# Patient Record
Sex: MALE | Race: BLACK OR AFRICAN AMERICAN | Employment: FULL TIME | URBAN - METROPOLITAN AREA
[De-identification: names, ages, dates, MRNs, and addresses within clinical notes are randomized per-mention and may not be internally consistent; named-entity substitution may affect disease eponyms.]

---

## 2019-02-20 ENCOUNTER — HOSPITAL ENCOUNTER (EMERGENCY)
Age: 39
Discharge: HOME | End: 2019-02-20
Payer: COMMERCIAL

## 2019-02-20 VITALS
RESPIRATION RATE: 16 BRPM | SYSTOLIC BLOOD PRESSURE: 134 MMHG | BODY MASS INDEX: 29.12 KG/M2 | HEIGHT: 72 IN | OXYGEN SATURATION: 97 % | HEART RATE: 84 BPM | WEIGHT: 215 LBS | TEMPERATURE: 98.5 F | DIASTOLIC BLOOD PRESSURE: 84 MMHG

## 2019-02-20 DIAGNOSIS — J02.9 ACUTE PHARYNGITIS, UNSPECIFIED ETIOLOGY: Primary | ICD-10-CM

## 2019-02-20 LAB — STREP GRP A PCR: NEGATIVE

## 2019-02-20 PROCEDURE — 99283 EMERGENCY DEPT VISIT LOW MDM: CPT

## 2019-02-20 PROCEDURE — 6370000000 HC RX 637 (ALT 250 FOR IP): Performed by: PHYSICIAN ASSISTANT

## 2019-02-20 PROCEDURE — 87880 STREP A ASSAY W/OPTIC: CPT

## 2019-02-20 RX ORDER — PREDNISONE 20 MG/1
60 TABLET ORAL ONCE
Status: COMPLETED | OUTPATIENT
Start: 2019-02-20 | End: 2019-02-20

## 2019-02-20 RX ORDER — PREDNISONE 10 MG/1
40 TABLET ORAL DAILY
Qty: 20 TABLET | Refills: 0 | Status: SHIPPED | OUTPATIENT
Start: 2019-02-20 | End: 2019-02-25

## 2019-02-20 RX ORDER — HYDROCHLOROTHIAZIDE 25 MG/1
25 TABLET ORAL DAILY
COMMUNITY

## 2019-02-20 RX ORDER — AMLODIPINE BESYLATE 10 MG/1
10 TABLET ORAL DAILY
COMMUNITY

## 2019-02-20 RX ADMIN — PREDNISONE 60 MG: 20 TABLET ORAL at 14:41

## 2019-03-30 ENCOUNTER — HOSPITAL ENCOUNTER (OUTPATIENT)
Age: 39
Setting detail: OBSERVATION
Discharge: HOME | End: 2019-03-31
Attending: EMERGENCY MEDICINE | Admitting: INTERNAL MEDICINE
Payer: COMMERCIAL

## 2019-03-30 ENCOUNTER — APPOINTMENT (OUTPATIENT)
Dept: GENERAL RADIOLOGY | Age: 39
End: 2019-03-30
Payer: COMMERCIAL

## 2019-03-30 DIAGNOSIS — F14.10 COCAINE ABUSE (HCC): ICD-10-CM

## 2019-03-30 DIAGNOSIS — R07.9 CHEST PAIN, UNSPECIFIED TYPE: Primary | ICD-10-CM

## 2019-03-30 LAB
ACETAMINOPHEN LEVEL: <5 MCG/ML (ref 10–30)
AMPHETAMINE SCREEN, URINE: NOT DETECTED
ANION GAP SERPL CALCULATED.3IONS-SCNC: 14 MMOL/L (ref 7–16)
BARBITURATE SCREEN URINE: NOT DETECTED
BASOPHILS ABSOLUTE: 0.03 E9/L (ref 0–0.2)
BASOPHILS RELATIVE PERCENT: 0.3 % (ref 0–2)
BENZODIAZEPINE SCREEN, URINE: NOT DETECTED
BUN BLDV-MCNC: 10 MG/DL (ref 6–20)
CALCIUM SERPL-MCNC: 9.4 MG/DL (ref 8.6–10.2)
CANNABINOID SCREEN URINE: POSITIVE
CHLORIDE BLD-SCNC: 101 MMOL/L (ref 98–107)
CO2: 27 MMOL/L (ref 22–29)
COCAINE METABOLITE SCREEN URINE: POSITIVE
CREAT SERPL-MCNC: 1 MG/DL (ref 0.7–1.2)
EOSINOPHILS ABSOLUTE: 0 E9/L (ref 0.05–0.5)
EOSINOPHILS RELATIVE PERCENT: 0 % (ref 0–6)
ETHANOL: <10 MG/DL (ref 0–0.08)
GFR AFRICAN AMERICAN: >60
GFR NON-AFRICAN AMERICAN: >60 ML/MIN/1.73
GLUCOSE BLD-MCNC: 94 MG/DL (ref 74–99)
HCT VFR BLD CALC: 39 % (ref 37–54)
HEMOGLOBIN: 13.2 G/DL (ref 12.5–16.5)
IMMATURE GRANULOCYTES #: 0.04 E9/L
IMMATURE GRANULOCYTES %: 0.4 % (ref 0–5)
LACTIC ACID: 0.8 MMOL/L (ref 0.5–2.2)
LYMPHOCYTES ABSOLUTE: 2.17 E9/L (ref 1.5–4)
LYMPHOCYTES RELATIVE PERCENT: 20.8 % (ref 20–42)
MCH RBC QN AUTO: 28 PG (ref 26–35)
MCHC RBC AUTO-ENTMCNC: 33.8 % (ref 32–34.5)
MCV RBC AUTO: 82.8 FL (ref 80–99.9)
METHADONE SCREEN, URINE: NOT DETECTED
MONOCYTES ABSOLUTE: 1.01 E9/L (ref 0.1–0.95)
MONOCYTES RELATIVE PERCENT: 9.7 % (ref 2–12)
NEUTROPHILS ABSOLUTE: 7.2 E9/L (ref 1.8–7.3)
NEUTROPHILS RELATIVE PERCENT: 68.8 % (ref 43–80)
OPIATE SCREEN URINE: NOT DETECTED
PDW BLD-RTO: 13.7 FL (ref 11.5–15)
PHENCYCLIDINE SCREEN URINE: NOT DETECTED
PLATELET # BLD: 276 E9/L (ref 130–450)
PMV BLD AUTO: 9.5 FL (ref 7–12)
POTASSIUM REFLEX MAGNESIUM: 3.9 MMOL/L (ref 3.5–5)
PROPOXYPHENE SCREEN: NOT DETECTED
RBC # BLD: 4.71 E12/L (ref 3.8–5.8)
SALICYLATE, SERUM: <0.3 MG/DL (ref 0–30)
SODIUM BLD-SCNC: 142 MMOL/L (ref 132–146)
TOTAL CK: 794 U/L (ref 20–200)
TOTAL CK: 821 U/L (ref 20–200)
TOTAL CK: 868 U/L (ref 20–200)
TRICYCLIC ANTIDEPRESSANTS SCREEN SERUM: NEGATIVE NG/ML
TROPONIN: <0.01 NG/ML (ref 0–0.03)
WBC # BLD: 10.5 E9/L (ref 4.5–11.5)

## 2019-03-30 PROCEDURE — 2580000003 HC RX 258: Performed by: INTERNAL MEDICINE

## 2019-03-30 PROCEDURE — 99285 EMERGENCY DEPT VISIT HI MDM: CPT

## 2019-03-30 PROCEDURE — 99219 PR INITIAL OBSERVATION CARE/DAY 50 MINUTES: CPT | Performed by: INTERNAL MEDICINE

## 2019-03-30 PROCEDURE — G0480 DRUG TEST DEF 1-7 CLASSES: HCPCS

## 2019-03-30 PROCEDURE — 6360000002 HC RX W HCPCS: Performed by: STUDENT IN AN ORGANIZED HEALTH CARE EDUCATION/TRAINING PROGRAM

## 2019-03-30 PROCEDURE — 83605 ASSAY OF LACTIC ACID: CPT

## 2019-03-30 PROCEDURE — 85025 COMPLETE CBC W/AUTO DIFF WBC: CPT

## 2019-03-30 PROCEDURE — 82550 ASSAY OF CK (CPK): CPT

## 2019-03-30 PROCEDURE — G0378 HOSPITAL OBSERVATION PER HR: HCPCS

## 2019-03-30 PROCEDURE — 80307 DRUG TEST PRSMV CHEM ANLYZR: CPT

## 2019-03-30 PROCEDURE — 6360000002 HC RX W HCPCS: Performed by: INTERNAL MEDICINE

## 2019-03-30 PROCEDURE — 71045 X-RAY EXAM CHEST 1 VIEW: CPT

## 2019-03-30 PROCEDURE — 93005 ELECTROCARDIOGRAM TRACING: CPT | Performed by: STUDENT IN AN ORGANIZED HEALTH CARE EDUCATION/TRAINING PROGRAM

## 2019-03-30 PROCEDURE — 96372 THER/PROPH/DIAG INJ SC/IM: CPT

## 2019-03-30 PROCEDURE — 96374 THER/PROPH/DIAG INJ IV PUSH: CPT

## 2019-03-30 PROCEDURE — 36415 COLL VENOUS BLD VENIPUNCTURE: CPT

## 2019-03-30 PROCEDURE — 80048 BASIC METABOLIC PNL TOTAL CA: CPT

## 2019-03-30 PROCEDURE — 6370000000 HC RX 637 (ALT 250 FOR IP): Performed by: INTERNAL MEDICINE

## 2019-03-30 PROCEDURE — 84484 ASSAY OF TROPONIN QUANT: CPT

## 2019-03-30 RX ORDER — HYDROCHLOROTHIAZIDE 25 MG/1
25 TABLET ORAL DAILY
Status: DISCONTINUED | OUTPATIENT
Start: 2019-03-30 | End: 2019-03-31 | Stop reason: HOSPADM

## 2019-03-30 RX ORDER — AMLODIPINE BESYLATE 10 MG/1
10 TABLET ORAL DAILY
Status: DISCONTINUED | OUTPATIENT
Start: 2019-03-30 | End: 2019-03-31 | Stop reason: HOSPADM

## 2019-03-30 RX ORDER — SODIUM CHLORIDE 0.9 % (FLUSH) 0.9 %
10 SYRINGE (ML) INJECTION PRN
Status: DISCONTINUED | OUTPATIENT
Start: 2019-03-30 | End: 2019-03-31 | Stop reason: HOSPADM

## 2019-03-30 RX ORDER — ONDANSETRON 2 MG/ML
4 INJECTION INTRAMUSCULAR; INTRAVENOUS EVERY 6 HOURS PRN
Status: DISCONTINUED | OUTPATIENT
Start: 2019-03-30 | End: 2019-03-31 | Stop reason: HOSPADM

## 2019-03-30 RX ORDER — LORAZEPAM 2 MG/ML
1 INJECTION INTRAMUSCULAR ONCE
Status: COMPLETED | OUTPATIENT
Start: 2019-03-30 | End: 2019-03-30

## 2019-03-30 RX ORDER — ALBUTEROL SULFATE 2.5 MG/3ML
2.5 SOLUTION RESPIRATORY (INHALATION) EVERY 6 HOURS PRN
Status: DISCONTINUED | OUTPATIENT
Start: 2019-03-30 | End: 2019-03-31 | Stop reason: HOSPADM

## 2019-03-30 RX ORDER — ACETAMINOPHEN 325 MG/1
650 TABLET ORAL EVERY 4 HOURS PRN
Status: DISCONTINUED | OUTPATIENT
Start: 2019-03-30 | End: 2019-03-31 | Stop reason: HOSPADM

## 2019-03-30 RX ORDER — SODIUM CHLORIDE 0.9 % (FLUSH) 0.9 %
10 SYRINGE (ML) INJECTION EVERY 12 HOURS SCHEDULED
Status: DISCONTINUED | OUTPATIENT
Start: 2019-03-30 | End: 2019-03-31 | Stop reason: HOSPADM

## 2019-03-30 RX ORDER — SODIUM CHLORIDE 9 MG/ML
INJECTION, SOLUTION INTRAVENOUS CONTINUOUS
Status: DISCONTINUED | OUTPATIENT
Start: 2019-03-30 | End: 2019-03-31 | Stop reason: HOSPADM

## 2019-03-30 RX ADMIN — HYDROCHLOROTHIAZIDE 25 MG: 25 TABLET ORAL at 14:37

## 2019-03-30 RX ADMIN — Medication 10 ML: at 14:37

## 2019-03-30 RX ADMIN — LORAZEPAM 1 MG: 2 INJECTION INTRAMUSCULAR; INTRAVENOUS at 08:01

## 2019-03-30 RX ADMIN — SODIUM CHLORIDE: 9 INJECTION, SOLUTION INTRAVENOUS at 15:49

## 2019-03-30 RX ADMIN — AMLODIPINE BESYLATE 10 MG: 10 TABLET ORAL at 14:37

## 2019-03-30 RX ADMIN — SODIUM CHLORIDE: 9 INJECTION, SOLUTION INTRAVENOUS at 22:31

## 2019-03-30 RX ADMIN — ENOXAPARIN SODIUM 40 MG: 40 INJECTION, SOLUTION INTRAVENOUS; SUBCUTANEOUS at 14:37

## 2019-03-30 ASSESSMENT — ENCOUNTER SYMPTOMS
WHEEZING: 0
COUGH: 0
PHOTOPHOBIA: 0
SHORTNESS OF BREATH: 0
CHEST TIGHTNESS: 1
ABDOMINAL DISTENTION: 0
ABDOMINAL PAIN: 0
COLOR CHANGE: 0

## 2019-03-30 ASSESSMENT — PAIN SCALES - GENERAL
PAINLEVEL_OUTOF10: 0
PAINLEVEL_OUTOF10: 7
PAINLEVEL_OUTOF10: 0

## 2019-03-30 ASSESSMENT — PAIN DESCRIPTION - LOCATION: LOCATION: CHEST

## 2019-03-30 ASSESSMENT — PAIN DESCRIPTION - ORIENTATION: ORIENTATION: MID

## 2019-03-30 ASSESSMENT — PAIN DESCRIPTION - PAIN TYPE: TYPE: ACUTE PAIN

## 2019-03-30 NOTE — H&P
[facility]  Internal Medicine Residency Program  History and Physical    Patient:  Je Sanders 44 y.o. male MRN: [id number]     Date of Service: 3/30/2019    Hospital Day: 1      Chief complaint: chest pain  History of Present Illness   The patient is a 44 y.o. male with a PMH of HTN, drug abuse, presented to ED due to chest pain that started this morning. Patient previous well,  admits to have been up all night drinking and took some cocaine. While lying down he experienced sudden chest pain described as heaviness located at the L sternal area, grad 7/10, radiating to the L shoulder. This was associated with palpitation and SOB. Persistence prompted consult. Patient states he had similar episode of chest pain due to cocaine intake before. He was seen by cardio in 2016 due to CP and palpitations. ECHO done revealed  EF 65%, mild LVH and trace TR. Stress test was also done and was negative. Patient also states some chronic dry cough that has started 1 month PTC. He denies and fever, chills, hemoptysis, weight loss. At the ED, BP was elevated at 175/94 while lung auscultation revealed some wheezing. Labs showed elevated total  and trop <0.01. EKG showed NSR. UDS was (+) for cannabinoid and cocaine. Past Medical History:      Diagnosis Date    Hypertension     Lower back pain     Obesity     Palpitations     Sleep apnea Was ref for sleep study    not assessed yet       Past Surgical History:    History reviewed. No pertinent surgical history. Medications Prior to Admission:    Prior to Admission medications    Medication Sig Start Date End Date Taking?  Authorizing Provider   amLODIPine (NORVASC) 10 MG tablet Take 10 mg by mouth daily   Yes Historical Provider, MD   hydrochlorothiazide (HYDRODIURIL) 25 MG tablet Take 25 mg by mouth daily   Yes Historical Provider, MD   lisinopril (PRINIVIL;ZESTRIL) 20 MG tablet Take 20 mg by mouth daily   Yes Historical Provider, MD       Allergies:  Patient has no known allergies. Social History:   TOBACCO:   reports that he has never smoked. He has never used smokeless tobacco.  ETOH:   reports that he drinks alcohol. Family History:       Problem Relation Age of Onset   Marge Castro Hypertension Mother     Other Mother         polio    Hypertension Father     High Cholesterol Father     COPD Maternal Grandmother     Diabetes Paternal Grandmother        REVIEW OF SYSTEMS:    · Constitutional: No fever, no chills, no change in weight; good appetite  · HEENT: No blurred vision, no ear problems, no sore throat, no rhinorrhea. · Respiratory: (+) cough, no sputum production, no pleuritic chest pain, no shortness of breath  · Cardiology: (+) angina, no dyspnea on exertion, no paroxysmal nocturnal dyspnea, no orthopnea,  (+) palpitation, no leg swelling. · Gastroenterology: No dysphagia, no reflux; no abdominal pain, no nausea or vomiting; no constipation or diarrhea.  No hematochezia   · Genitourinary: No dysuria, no frequency, hesitancy; no hematuria  · Musculoskeletal: no joint pain, no myalgia, no change in range of movement  · Neurology: no focal weakness in extremities, no slurred speech, no double vision, no tingling or numbness sensation  · Endocrinology: no temperature intolerance, no polyphagia, polydipsia or polyuria  · Hematology: no increased bleeding, no bruising, no lymphadenopathy  · Skin: no skin changes noticed by patient  · Psychology: no depressed mood, no suicidal ideation    Physical Exam   · Vitals: BP (!) 155/87   Pulse 74   Temp 97.4 °F (36.3 °C) (Temporal)   Resp 16   Ht 6' (1.829 m)   Wt 215 lb (97.5 kg)   SpO2 100%   BMI 29.16 kg/m²     · General Appearance: alert and oriented to person, place and time, well-developed and well-nourished, in no acute distress  · Skin: warm and dry, no rash or erythema  · Head: normocephalic and atraumatic  · Eyes: pupils equal, round, and reactive to light, Assessment and Plan     Candelaria Chow is a 44 y.o. male with a PMH of HTN, drug abuse, presented to ED due to chest pain that started this morning. > Chest pain   2/2  Likely cocaine use  had similar episode in the past  seen by cardiology, ECHO 2016 EF 65%, mild LVH, trace MR while Stress was negative, was lost to follow-up  Trop <0.01  EKG showed no significant changes  Heart Score: 1  With low HS and likely d/t drug induced will hold cardio consult for now  WOF for vasopasm  Will trend trop x 2    > Elevated CK levels  2/2 ? Rhabdomyolysis from cocaine use  CK on admission 798  Trend CK   Start IVF  cc/hr  Monitor UO and renal function     > HTN  Continue amlodpine + HCTZ  Monitor BP  NO beta blocker ( d/t cocaine use)    > Acute Bronchitis in the setting  Of hx of Asthma? PE on admission: (+) wheezing  Admits to chronic cough > 1 month   States that he has used inhalers in the past  CXR: opacities in the lower lungs ? Infectious infiltrates   No leukocytosis, no febrile episodes  No ATB for now   For procal   Start albuterol nebulization q6 PRN      >Drug -abuse  Admits to using cocaine  Monitor for signs of withdrawal  Consider ativan LD if needed      DVT prophylaxis/ GI prophylaxis: lovenox /diet   Disposition: admit , for possible discharge maribel Marte MD, PGY-1   Attending physician: Dr. Mino Radford       Attending Physician Statement  I have discussed the case, including pertinent history and exam findings with the resident. I have seen and examined the patient and the key elements of the encounter have been performed by me. I agree with the assessment, plan and orders as documented by the resident. Chest pain secondary to cocaine use EKG is normal as well as 3 sets of troponins. I discussed with him very specifically how dangerous and even deadly cocaine use can be and he understands.  His heart score is low probability and given that we have observed him for 24 hours after stable for him to be discharged. The patient is in agreement. He also understands that given that we cannot know the dose or strength of cocaine he took there can be some vasospasms in the next 2448 hrs. and he will return to the hospital if he has any more symptoms. Nikki Cadena D.O.  Board Certified Internal Medicine   3/31/2019 2:39 PM

## 2019-03-30 NOTE — LETTER
[facility]  Phone: [phone]    No name on file. March 31, 2019     Patient: Bhavin Leblanc   YOB: 1980   Date of Visit: 3/30/2019       To Whom it May Concern:    Elissa Rangel was admitted from 3/30/2019 to 3/31/2019. He may return to work on 4/1/2019. If you have any questions or concerns, please don't hesitate to call. Sincerely,     Maria C Pelaez MD on 3/31/19 at 2:09 PM  Internal medicine    No name on file.

## 2019-03-30 NOTE — ED PROVIDER NOTES
Kanwal Stevens is a 44year old male with PMH of atham, who presents with chest pain that started two hours ago. Patient reported drinking and doing cocaine last night. Patient has no cardiac hx and no family history of early MI. Patient has never had anything like this happen before. Patient reported doing cocaine until around 4am today. Patient has no hx of VTE. He does not have any shortness of breath. Patient is not on any medication. The history is provided by the patient. Chest Pain   Pain location:  Substernal area and L chest  Pain quality: sharp and stabbing    Pain severity:  Severe  Onset quality:  Sudden  Duration:  2 hours  Timing:  Constant  Progression:  Worsening  Chronicity:  New  Context: drug use and at rest    Relieved by:  Nothing  Worsened by:  Nothing  Ineffective treatments:  None tried  Associated symptoms: no abdominal pain, no cough, no diaphoresis, no dizziness, no fatigue, no fever, no headache, no palpitations and no shortness of breath        Review of Systems   Constitutional: Negative for chills, diaphoresis, fatigue and fever. Eyes: Negative for photophobia and visual disturbance. Respiratory: Positive for chest tightness. Negative for cough, shortness of breath and wheezing. Cardiovascular: Positive for chest pain. Negative for palpitations and leg swelling. Gastrointestinal: Negative for abdominal distention and abdominal pain. Genitourinary: Negative for difficulty urinating and dysuria. Musculoskeletal: Negative for neck pain and neck stiffness. Skin: Negative for color change, pallor and rash. Neurological: Negative for dizziness, facial asymmetry and headaches. Psychiatric/Behavioral: Negative for agitation, behavioral problems and confusion. Physical Exam   Constitutional: He is oriented to person, place, and time. He appears well-developed and well-nourished.    Appears to be in moderate distress due to pain   HENT:   Head: Normocephalic and atraumatic. Eyes: EOM are normal. Right eye exhibits no discharge. Left eye exhibits no discharge. Neck: Neck supple. Cardiovascular: Regular rhythm and intact distal pulses. tachycardic   Pulmonary/Chest: Effort normal. No stridor. No respiratory distress. He has wheezes (mild wheezing ). He has no rales. He exhibits no tenderness. Abdominal: Soft. Bowel sounds are normal. He exhibits no distension. There is no tenderness. Musculoskeletal: He exhibits no edema, tenderness or deformity. Neurological: He is alert and oriented to person, place, and time. No cranial nerve deficit. Skin: Skin is warm and dry. No erythema. No pallor. Psychiatric: He has a normal mood and affect. His behavior is normal.   Nursing note and vitals reviewed. Procedures    MDM  Number of Diagnoses or Management Options  Chest pain, unspecified type:   Cocaine abuse [facility]):   Diagnosis management comments: Xavier Del Castillo presented following cocaine use at home and chest pain. Patient arrived with severe chest pain that improved with IV ativan. Patient's evaluation including EKG and troponin were not remarkable. Patient will be admitted for observation. Patient has been stable while in ED with improvement of chest pain. Patient's finance reported patient was using cocaine all day and night yesterday. Patient denies daily alcohol use.               --------------------------------------------- PAST HISTORY ---------------------------------------------  Past Medical History:  has a past medical history of Hypertension, Lower back pain, Obesity, Palpitations, and Sleep apnea. Past Surgical History:  has no past surgical history on file. Social History:  reports that he has never smoked. He has never used smokeless tobacco. He reports that he drinks alcohol. He reports that he does not use drugs.     Family History: family history includes COPD in his maternal grandmother; Diabetes in his paternal grandmother; High ------------------------------------------  Re-evaluation(s):  Time: 1000am  Patients symptoms are improving  Repeat physical examination is improved    Counseling:  I have spoken with the patient and discussed todays results, in addition to providing specific details for the plan of care and counseling regarding the diagnosis and prognosis. Their questions are answered at this time and they are agreeable with the plan of admission.    --------------------------------- ADDITIONAL PROVIDER NOTES ---------------------------------  Consultations:  Spoke with Dr. [place] Howard. Discussed case. They will admit the patient. This patient's ED course included: a personal history and physicial examination, re-evaluation prior to disposition, IV medications, cardiac monitoring and continuous pulse oximetry    This patient has remained hemodynamically stable during their ED course. Diagnosis:  1. Chest pain, unspecified type    2. Cocaine abuse ([facility] [address]        Disposition:  Patient's disposition: Admit to telemetry  Patient's condition is stable.                Rambo Brown MD  03/30/19 1024

## 2019-03-30 NOTE — LETTER
[facility] [address]  Phone: [phone]    No name on file. March 31, 2019     Patient: Doug Gatica   YOB: 1980   Date of Visit: 3/30/2019       To Whom it May Concern:    Xavier Del Castillo was admitted to hospital from  3/30/2019 to 3/31/2019. He may return to work on 4/1/2019. If you have any questions or concerns, please don't hesitate to call.     Sincerely,         Dimple Siu MD on 3/31/19 at 2:15 PM  Internal Medicine

## 2019-03-30 NOTE — PLAN OF CARE
Problem: Cardiac:  Goal: Hemodynamic stability will improve  Description  Hemodynamic stability will improve  Outcome: Met This Shift     Problem: Fluid Volume:  Goal: Will show no signs or symptoms of fluid imbalance  Description  Will show no signs or symptoms of fluid imbalance  Outcome: Met This Shift     Problem: Pain:  Goal: Control of acute pain  Description  Control of acute pain  Outcome: Met This Shift

## 2019-03-31 VITALS
RESPIRATION RATE: 18 BRPM | HEIGHT: 72 IN | HEART RATE: 70 BPM | WEIGHT: 215 LBS | SYSTOLIC BLOOD PRESSURE: 138 MMHG | DIASTOLIC BLOOD PRESSURE: 99 MMHG | OXYGEN SATURATION: 97 % | TEMPERATURE: 97.5 F | BODY MASS INDEX: 29.12 KG/M2

## 2019-03-31 LAB
EKG ATRIAL RATE: 98 BPM
EKG P AXIS: 73 DEGREES
EKG P-R INTERVAL: 162 MS
EKG Q-T INTERVAL: 356 MS
EKG QRS DURATION: 90 MS
EKG QTC CALCULATION (BAZETT): 454 MS
EKG R AXIS: 71 DEGREES
EKG T AXIS: 56 DEGREES
EKG VENTRICULAR RATE: 98 BPM
PROCALCITONIN: 0.05 NG/ML (ref 0–0.08)
TOTAL CK: 603 U/L (ref 20–200)
TOTAL CK: 652 U/L (ref 20–200)
TOTAL CK: 788 U/L (ref 20–200)
TROPONIN: <0.01 NG/ML (ref 0–0.03)

## 2019-03-31 PROCEDURE — 84484 ASSAY OF TROPONIN QUANT: CPT

## 2019-03-31 PROCEDURE — 6370000000 HC RX 637 (ALT 250 FOR IP): Performed by: INTERNAL MEDICINE

## 2019-03-31 PROCEDURE — 6360000002 HC RX W HCPCS: Performed by: INTERNAL MEDICINE

## 2019-03-31 PROCEDURE — 84145 PROCALCITONIN (PCT): CPT

## 2019-03-31 PROCEDURE — 2580000003 HC RX 258: Performed by: INTERNAL MEDICINE

## 2019-03-31 PROCEDURE — 96361 HYDRATE IV INFUSION ADD-ON: CPT

## 2019-03-31 PROCEDURE — 96372 THER/PROPH/DIAG INJ SC/IM: CPT

## 2019-03-31 PROCEDURE — 36415 COLL VENOUS BLD VENIPUNCTURE: CPT

## 2019-03-31 PROCEDURE — 82550 ASSAY OF CK (CPK): CPT

## 2019-03-31 PROCEDURE — G0378 HOSPITAL OBSERVATION PER HR: HCPCS

## 2019-03-31 RX ORDER — LISINOPRIL 20 MG/1
20 TABLET ORAL DAILY
Status: DISCONTINUED | OUTPATIENT
Start: 2019-03-31 | End: 2019-03-31

## 2019-03-31 RX ADMIN — HYDROCHLOROTHIAZIDE 25 MG: 25 TABLET ORAL at 09:01

## 2019-03-31 RX ADMIN — AMLODIPINE BESYLATE 10 MG: 10 TABLET ORAL at 09:01

## 2019-03-31 RX ADMIN — ENOXAPARIN SODIUM 40 MG: 40 INJECTION, SOLUTION INTRAVENOUS; SUBCUTANEOUS at 09:01

## 2019-03-31 RX ADMIN — SODIUM CHLORIDE: 9 INJECTION, SOLUTION INTRAVENOUS at 05:33

## 2019-03-31 RX ADMIN — SODIUM CHLORIDE: 9 INJECTION, SOLUTION INTRAVENOUS at 12:35

## 2019-03-31 ASSESSMENT — PAIN SCALES - GENERAL: PAINLEVEL_OUTOF10: 0

## 2019-03-31 NOTE — PLAN OF CARE
Problem: Cardiac:  Goal: Hemodynamic stability will improve  Description  Hemodynamic stability will improve  Outcome: Met This Shift     Problem: Fluid Volume:  Goal: Will show no signs or symptoms of fluid imbalance  Description  Will show no signs or symptoms of fluid imbalance  Outcome: Met This Shift     Problem: Pain:  Goal: Control of acute pain  Description  Control of acute pain  Outcome: Met This Shift     Problem: Tissue Perfusion - Cardiopulmonary, Altered:  Goal: Absence of angina  Description  Absence of angina  Outcome: Met This Shift

## 2019-03-31 NOTE — PLAN OF CARE
Problem: Pain:  Goal: Control of acute pain  Description  Control of acute pain  3/30/2019 2321 by Da Del Real RN  Outcome: Met This Shift     Problem: Tissue Perfusion - Cardiopulmonary, Altered:  Goal: Absence of angina  Description  Absence of angina  Outcome: Met This Shift     Problem: Cardiac:  Goal: Complications related to the disease process, condition or treatment will be avoided or minimized  Description  Complications related to the disease process, condition or treatment will be avoided or minimized  Outcome: Ongoing     Problem: Tobacco Use:  Goal: Will participate in inpatient tobacco-use cessation counseling  Description  Will participate in inpatient tobacco-use cessation counseling  Outcome: Ongoing

## 2019-04-01 NOTE — DISCHARGE SUMMARY
Internal Medicine Department   Discharge summary    Patient ID:  Disha Bueno  44 y.o.  1980    Admission Date: 3/30/2019     Discharge Date: 3/31/2019       House team:1     Admission Dx: 1. Chest pain 2/2 cocaine use  2. Rhabdomyolysis  3. HTN  4. Acute bronchitis  5. Hx of asthma    Discharge Dx: 1. Chest pain 2/2 cocaine use  2. Rhabdomyolysis  3. HTN  4. Acute bronchitis  5. Hx of asthma    Consults:  -    Procedures:  No discharge procedures on file. Brief summary of the patient course:  44 y.o. male with a PMH of HTN, asthma,drug abuse, presented to ED due to chest pain that started this morning. Patient previous well,  admits to have been up all night drinking and took some cocaine. While lying down he experienced sudden chest pain described as heaviness located at the L sternal area, grad 7/10, radiating to the L shoulder. This was associated with palpitation and SOB.  Patient states he had similar episode of chest pain due to cocaine intake before. He was seen by cardio in 2016 due to CP and palpitations. ECHO done revealed  EF 65%, mild LVH and trace TR. Stress test was also done and was negative. Patient also states some chronic dry cough that has started 1 month PTC. On arrival,BP was elevated at 175/94 while lung auscultation revealed some wheezing. Labs showed elevated total  and trop <0.01. EKG:NSR. UDS was (+) for cannabinoid and cocaine. Patient received ativan following which his symptoms resolved. Following admission his cycle troponin was negative and hemodynamically stable, chest pain free.  Patient was discharge on stable condition and advice to follow in IM [facility] to set up with PCP.       Discharge medication:   Kassi Forrester   Home Medication Instructions ONS:[id number]    Printed on:03/31/19 5352   Medication Information                      amLODIPine (NORVASC) 10 MG tablet  Take 10 mg by mouth daily             hydrochlorothiazide (HYDRODIURIL) 25 MG tablet  Take 25 mg by

## 2019-04-06 LAB
CANNABINOIDS CONF, URINE: 162 NG/ML
COCAINE, CONFIRM, URINE: >1000 NG/ML

## 2019-08-23 ENCOUNTER — APPOINTMENT (OUTPATIENT)
Dept: GENERAL RADIOLOGY | Age: 39
End: 2019-08-23
Payer: COMMERCIAL

## 2019-08-23 ENCOUNTER — HOSPITAL ENCOUNTER (EMERGENCY)
Age: 39
Discharge: HOME | End: 2019-08-23
Attending: EMERGENCY MEDICINE
Payer: COMMERCIAL

## 2019-08-23 VITALS
DIASTOLIC BLOOD PRESSURE: 106 MMHG | HEART RATE: 68 BPM | OXYGEN SATURATION: 100 % | BODY MASS INDEX: 29.8 KG/M2 | SYSTOLIC BLOOD PRESSURE: 162 MMHG | RESPIRATION RATE: 16 BRPM | HEIGHT: 72 IN | TEMPERATURE: 97.9 F | WEIGHT: 220 LBS

## 2019-08-23 DIAGNOSIS — R07.89 CHEST WALL PAIN: Primary | ICD-10-CM

## 2019-08-23 DIAGNOSIS — J18.9 COMMUNITY ACQUIRED PNEUMONIA OF RIGHT LOWER LOBE OF LUNG: ICD-10-CM

## 2019-08-23 LAB
ANION GAP SERPL CALCULATED.3IONS-SCNC: 6 MMOL/L (ref 7–16)
BASOPHILS ABSOLUTE: 0.02 E9/L (ref 0–0.2)
BASOPHILS RELATIVE PERCENT: 0.3 % (ref 0–2)
BUN BLDV-MCNC: 14 MG/DL (ref 6–20)
CALCIUM SERPL-MCNC: 9.4 MG/DL (ref 8.6–10.2)
CHLORIDE BLD-SCNC: 103 MMOL/L (ref 98–107)
CO2: 32 MMOL/L (ref 22–29)
CREAT SERPL-MCNC: 1.2 MG/DL (ref 0.7–1.2)
EKG ATRIAL RATE: 70 BPM
EKG P AXIS: 48 DEGREES
EKG P-R INTERVAL: 146 MS
EKG Q-T INTERVAL: 380 MS
EKG QRS DURATION: 96 MS
EKG QTC CALCULATION (BAZETT): 410 MS
EKG R AXIS: 60 DEGREES
EKG T AXIS: 41 DEGREES
EKG VENTRICULAR RATE: 70 BPM
EOSINOPHILS ABSOLUTE: 0.31 E9/L (ref 0.05–0.5)
EOSINOPHILS RELATIVE PERCENT: 4.8 % (ref 0–6)
GFR AFRICAN AMERICAN: >60
GFR NON-AFRICAN AMERICAN: >60 ML/MIN/1.73
GLUCOSE BLD-MCNC: 105 MG/DL (ref 74–99)
HCT VFR BLD CALC: 43.5 % (ref 37–54)
HEMOGLOBIN: 14.2 G/DL (ref 12.5–16.5)
IMMATURE GRANULOCYTES #: 0.02 E9/L
IMMATURE GRANULOCYTES %: 0.3 % (ref 0–5)
LYMPHOCYTES ABSOLUTE: 2.91 E9/L (ref 1.5–4)
LYMPHOCYTES RELATIVE PERCENT: 45.1 % (ref 20–42)
MCH RBC QN AUTO: 27.3 PG (ref 26–35)
MCHC RBC AUTO-ENTMCNC: 32.6 % (ref 32–34.5)
MCV RBC AUTO: 83.5 FL (ref 80–99.9)
MONOCYTES ABSOLUTE: 0.64 E9/L (ref 0.1–0.95)
MONOCYTES RELATIVE PERCENT: 9.9 % (ref 2–12)
NEUTROPHILS ABSOLUTE: 2.55 E9/L (ref 1.8–7.3)
NEUTROPHILS RELATIVE PERCENT: 39.6 % (ref 43–80)
PDW BLD-RTO: 13.8 FL (ref 11.5–15)
PLATELET # BLD: 260 E9/L (ref 130–450)
PMV BLD AUTO: 9.3 FL (ref 7–12)
POTASSIUM SERPL-SCNC: 3.8 MMOL/L (ref 3.5–5)
RBC # BLD: 5.21 E12/L (ref 3.8–5.8)
SODIUM BLD-SCNC: 141 MMOL/L (ref 132–146)
TROPONIN: <0.01 NG/ML (ref 0–0.03)
WBC # BLD: 6.5 E9/L (ref 4.5–11.5)

## 2019-08-23 PROCEDURE — 85025 COMPLETE CBC W/AUTO DIFF WBC: CPT

## 2019-08-23 PROCEDURE — 6360000002 HC RX W HCPCS: Performed by: PREVENTIVE MEDICINE

## 2019-08-23 PROCEDURE — 93010 ELECTROCARDIOGRAM REPORT: CPT | Performed by: INTERNAL MEDICINE

## 2019-08-23 PROCEDURE — 71045 X-RAY EXAM CHEST 1 VIEW: CPT

## 2019-08-23 PROCEDURE — 93005 ELECTROCARDIOGRAM TRACING: CPT | Performed by: PREVENTIVE MEDICINE

## 2019-08-23 PROCEDURE — 96374 THER/PROPH/DIAG INJ IV PUSH: CPT

## 2019-08-23 PROCEDURE — 84484 ASSAY OF TROPONIN QUANT: CPT

## 2019-08-23 PROCEDURE — 36415 COLL VENOUS BLD VENIPUNCTURE: CPT

## 2019-08-23 PROCEDURE — 6370000000 HC RX 637 (ALT 250 FOR IP): Performed by: PREVENTIVE MEDICINE

## 2019-08-23 PROCEDURE — 80048 BASIC METABOLIC PNL TOTAL CA: CPT

## 2019-08-23 PROCEDURE — 99285 EMERGENCY DEPT VISIT HI MDM: CPT

## 2019-08-23 RX ORDER — KETOROLAC TROMETHAMINE 30 MG/ML
15 INJECTION, SOLUTION INTRAMUSCULAR; INTRAVENOUS ONCE
Status: COMPLETED | OUTPATIENT
Start: 2019-08-23 | End: 2019-08-23

## 2019-08-23 RX ORDER — SODIUM CHLORIDE 0.9 % (FLUSH) 0.9 %
10 SYRINGE (ML) INJECTION PRN
Status: DISCONTINUED | OUTPATIENT
Start: 2019-08-23 | End: 2019-08-23 | Stop reason: HOSPADM

## 2019-08-23 RX ORDER — ASPIRIN 81 MG/1
324 TABLET, CHEWABLE ORAL ONCE
Status: COMPLETED | OUTPATIENT
Start: 2019-08-23 | End: 2019-08-23

## 2019-08-23 RX ORDER — DOXYCYCLINE 100 MG/1
100 CAPSULE ORAL 2 TIMES DAILY
Qty: 20 CAPSULE | Refills: 0 | Status: SHIPPED | OUTPATIENT
Start: 2019-08-23 | End: 2019-09-02

## 2019-08-23 RX ADMIN — ASPIRIN 81 MG 324 MG: 81 TABLET ORAL at 06:56

## 2019-08-23 RX ADMIN — KETOROLAC TROMETHAMINE 15 MG: 30 INJECTION, SOLUTION INTRAMUSCULAR; INTRAVENOUS at 07:58

## 2019-08-23 ASSESSMENT — PAIN DESCRIPTION - DESCRIPTORS: DESCRIPTORS: DISCOMFORT

## 2019-08-23 ASSESSMENT — PAIN DESCRIPTION - ORIENTATION: ORIENTATION: MID

## 2019-08-23 ASSESSMENT — PAIN SCALES - GENERAL
PAINLEVEL_OUTOF10: 5
PAINLEVEL_OUTOF10: 6

## 2019-08-23 ASSESSMENT — PAIN DESCRIPTION - PAIN TYPE: TYPE: ACUTE PAIN

## 2019-08-23 ASSESSMENT — PAIN DESCRIPTION - LOCATION: LOCATION: CHEST

## 2019-08-23 NOTE — ED PROVIDER NOTES
Department of Emergency Medicine   ED  Provider Note  Admit Date/RoomTime: 8/23/2019  6:40 AM  ED Room: 08/08        History of Present Illness:  8/23/19, Time: 7:26 AM         Darlyn Garcia is a 44 y.o. male presenting to the ED for chest pain, beginning 2 days ago. The complaint has been persistent, moderate in severity, and worsened by movement, cough, deep breath, bending forward. Pt states that he has chest pain. Reports that his pain is located to his lower mid-sternal area. States that this morning, he had pain when trying to clear is throat and came to the ED after. Pt has not tried any over the counter medication for his pain. Pt has more pain upon coughing, bending forward, deep breath, and movement. States that he has similar pain in the pain with a URI. He has had a cough recently but it is not productive. Pt advised to follow up with a pulmonologist but was not able to due to insurance reasons. 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 Pt also states that a few days prior to his pain, he was working out and doing pull ups. Pt states that he has also has fatigue after exertion. Patient denies fever/chills, edema, headache, visual disturbances, focal paresthesias, focal weakness, abdominal pain, nausea, vomiting, diarrhea, constipation, dysuria, hematuria, trauma, back pain or other complaints. Review of Systems:   Pertinent positives and negatives are stated within HPI, all other systems reviewed and are negative.      --------------------------------------------- PAST HISTORY ---------------------------------------------  Past Medical History:  has a past medical history of Hypertension, Lower back pain, Obesity, Palpitations, and Sleep apnea. Past Surgical History:  has no past surgical history on file. Social History:  reports that he has never smoked. He has never used smokeless tobacco. He reports that he drinks alcohol.  He reports that he has current or past drug history. Drug: Marijuana. Family History: family history includes COPD in his maternal grandmother; Diabetes in his paternal grandmother; High Cholesterol in his father; Hypertension in his father and mother; Other in his mother. The patients home medications have been reviewed. Allergies: Lisinopril      ---------------------------------------------------PHYSICAL EXAM--------------------------------------    Constitutional/General: Alert and oriented x3, well appearing, non toxic in NAD  Head: Normocephalic and atraumatic  Eyes: PERRL, EOMI, conjunctiva normal, sclera non icteric  Mouth: Oropharynx clear, handling secretions  Neck: Supple, full ROM  Respiratory: Lungs clear to auscultation bilaterally, no wheezes, rales, or rhonchi. Not in respiratory distress  Cardiovascular:  Regular rate. Regular rhythm. No murmurs, gallops, or rubs. Normal and equal radial and dorsalis pedis pulses bilaterally. Chest: Has tenderness to the lower sternal border. GI:  Abdomen Soft, Non tender, Non distended. +BS. No rebound, guarding, or rigidity. No pulsatile masses. Musculoskeletal: Moves all extremities x 4. Warm and well perfused, no edema. Integument: skin warm and dry. No rashes. Neurologic: GCS 15, no focal deficits, symmetric strength 5/5 in the upper and lower extremities bilaterally  Psychiatric: Normal Affect      -------------------------------------------------- RESULTS -------------------------------------------------  I have personally reviewed all laboratory and imaging results for this patient. Results are listed below.      LABS:  Results for orders placed or performed during the hospital encounter of 08/23/19   CBC auto differential   Result Value Ref Range    WBC 6.5 4.5 - 11.5 E9/L    RBC 5.21 3.80 - 5.80 E12/L    Hemoglobin 14.2 12.5 - 16.5 g/dL    Hematocrit 43.5 37.0 - 54.0 %    MCV 83.5 80.0 - 99.9 fL    MCH 27.3 26.0 - 35.0 pg    MCHC 32.6 32.0 - 34.5 %    RDW 13.8 11.5 - 15.0 fL    Platelets 074 048 - 082 E9/L    MPV 9.3 7.0 - 12.0 fL    Neutrophils % 39.6 (L) 43.0 - 80.0 %    Immature Granulocytes % 0.3 0.0 - 5.0 %    Lymphocytes % 45.1 (H) 20.0 - 42.0 %    Monocytes % 9.9 2.0 - 12.0 %    Eosinophils % 4.8 0.0 - 6.0 %    Basophils % 0.3 0.0 - 2.0 %    Neutrophils # 2.55 1.80 - 7.30 E9/L    Immature Granulocytes # 0.02 E9/L    Lymphocytes # 2.91 1.50 - 4.00 E9/L    Monocytes # 0.64 0.10 - 0.95 E9/L    Eosinophils # 0.31 0.05 - 0.50 E9/L    Basophils # 0.02 0.00 - 0.20 D0/L   Basic metabolic panel   Result Value Ref Range    Sodium 141 132 - 146 mmol/L    Potassium 3.8 3.5 - 5.0 mmol/L    Chloride 103 98 - 107 mmol/L    CO2 32 (H) 22 - 29 mmol/L    Anion Gap 6 (L) 7 - 16 mmol/L    Glucose 105 (H) 74 - 99 mg/dL    BUN 14 6 - 20 mg/dL    CREATININE 1.2 0.7 - 1.2 mg/dL    GFR Non-African American >60 >=60 mL/min/1.73    GFR African American >60     Calcium 9.4 8.6 - 10.2 mg/dL   Troponin   Result Value Ref Range    Troponin <0.01 0.00 - 0.03 ng/mL       RADIOLOGY:  Interpreted by Radiologist.  XR CHEST PORTABLE   Final Result   Mild cardiomegaly, unchanged                     EKG:  This EKG is signed and interpreted by the EP. Rate: 70  Rhythm: normal sinus   Interpretation: no acute changes      ------------------------- NURSING NOTES AND VITALS REVIEWED ---------------------------   The nursing notes within the ED encounter and vital signs as below have been reviewed by myself. BP (!) 162/106   Pulse 68   Temp 97.9 °F (36.6 °C)   Resp 16   Ht 6' (1.829 m)   Wt 220 lb (99.8 kg)   SpO2 100%   BMI 29.84 kg/m²   Oxygen Saturation Interpretation: Normal    The patients available past medical records and past encounters were reviewed.         ------------------------------ ED COURSE/MEDICAL DECISION MAKING----------------------  Medications   sodium chloride flush 0.9 % injection 10 mL (has no administration in time range)   aspirin chewable

## 2020-05-28 ENCOUNTER — HOSPITAL ENCOUNTER (EMERGENCY)
Age: 40
Discharge: LEFT BEFORE DISCHARGE | End: 2020-05-28
Payer: COMMERCIAL

## 2020-05-28 VITALS
TEMPERATURE: 97.8 F | RESPIRATION RATE: 18 BRPM | HEIGHT: 72 IN | OXYGEN SATURATION: 100 % | WEIGHT: 230 LBS | DIASTOLIC BLOOD PRESSURE: 86 MMHG | BODY MASS INDEX: 31.15 KG/M2 | HEART RATE: 81 BPM | SYSTOLIC BLOOD PRESSURE: 152 MMHG

## 2020-05-28 LAB
EKG ATRIAL RATE: 76 BPM
EKG P AXIS: 68 DEGREES
EKG P-R INTERVAL: 168 MS
EKG Q-T INTERVAL: 374 MS
EKG QRS DURATION: 96 MS
EKG QTC CALCULATION (BAZETT): 420 MS
EKG R AXIS: 73 DEGREES
EKG T AXIS: 53 DEGREES
EKG VENTRICULAR RATE: 76 BPM

## 2020-05-28 PROCEDURE — 93005 ELECTROCARDIOGRAM TRACING: CPT | Performed by: EMERGENCY MEDICINE

## 2020-05-28 PROCEDURE — 93010 ELECTROCARDIOGRAM REPORT: CPT | Performed by: INTERNAL MEDICINE

## 2020-05-28 PROCEDURE — 4500000002 HC ER NO CHARGE

## 2020-05-28 ASSESSMENT — PAIN DESCRIPTION - LOCATION: LOCATION: CHEST

## 2020-05-28 ASSESSMENT — PAIN DESCRIPTION - ORIENTATION: ORIENTATION: LEFT

## 2020-05-28 ASSESSMENT — PAIN DESCRIPTION - DESCRIPTORS: DESCRIPTORS: TIGHTNESS

## 2020-05-28 ASSESSMENT — PAIN DESCRIPTION - PAIN TYPE: TYPE: ACUTE PAIN

## 2020-05-28 ASSESSMENT — PAIN SCALES - GENERAL: PAINLEVEL_OUTOF10: 4

## 2020-05-28 NOTE — ED NOTES
Patient advised [facility] staff, \"he couldn't wait any longer\" due to a family  in the morning. I advised the patient if his symptoms got worse to come back. Patient advised he would.      John Jeronimo, EMT-P  20 [phone]

## 2024-08-08 ENCOUNTER — HOSPITAL ENCOUNTER (OUTPATIENT)
Dept: SLEEP CENTER | Age: 44
Discharge: HOME | End: 2024-08-08
Payer: COMMERCIAL

## 2024-08-08 DIAGNOSIS — I10 ESSENTIAL HYPERTENSION: Primary | ICD-10-CM

## 2024-08-08 DIAGNOSIS — R07.89 OTHER CHEST PAIN: ICD-10-CM

## 2024-08-08 DIAGNOSIS — R06.83 SNORES: ICD-10-CM

## 2024-08-08 DIAGNOSIS — R40.0 EXCESSIVE SLEEPINESS WHILE DRIVING: ICD-10-CM

## 2024-08-08 PROCEDURE — 95810 POLYSOM 6/> YRS 4/> PARAM: CPT

## 2024-08-09 VITALS
SYSTOLIC BLOOD PRESSURE: 140 MMHG | OXYGEN SATURATION: 96 % | HEART RATE: 75 BPM | HEIGHT: 75 IN | WEIGHT: 217 LBS | DIASTOLIC BLOOD PRESSURE: 89 MMHG | BODY MASS INDEX: 26.98 KG/M2

## 2024-08-09 ASSESSMENT — SLEEP AND FATIGUE QUESTIONNAIRES
HOW LIKELY ARE YOU TO NOD OFF OR FALL ASLEEP WHILE WATCHING TV: HIGH CHANCE OF DOZING
HOW LIKELY ARE YOU TO NOD OFF OR FALL ASLEEP IN A CAR, WHILE STOPPED FOR A FEW MINUTES IN TRAFFIC: HIGH CHANCE OF DOZING
HOW LIKELY ARE YOU TO NOD OFF OR FALL ASLEEP WHILE SITTING INACTIVE IN A PUBLIC PLACE: HIGH CHANCE OF DOZING
HOW LIKELY ARE YOU TO NOD OFF OR FALL ASLEEP WHILE SITTING AND READING: HIGH CHANCE OF DOZING
HOW LIKELY ARE YOU TO NOD OFF OR FALL ASLEEP WHEN YOU ARE A PASSENGER IN A CAR FOR AN HOUR WITHOUT A BREAK: HIGH CHANCE OF DOZING
HOW LIKELY ARE YOU TO NOD OFF OR FALL ASLEEP WHILE LYING DOWN TO REST IN THE AFTERNOON WHEN CIRCUMSTANCES PERMIT: HIGH CHANCE OF DOZING
ESS TOTAL SCORE: 24
HOW LIKELY ARE YOU TO NOD OFF OR FALL ASLEEP WHILE SITTING AND TALKING TO SOMEONE: HIGH CHANCE OF DOZING
HOW LIKELY ARE YOU TO NOD OFF OR FALL ASLEEP WHILE SITTING QUIETLY AFTER LUNCH WITHOUT ALCOHOL: HIGH CHANCE OF DOZING

## 2024-09-30 ENCOUNTER — TELEPHONE (OUTPATIENT)
Dept: PRIMARY CARE CLINIC | Age: 44
End: 2024-09-30

## 2024-09-30 NOTE — TELEPHONE ENCOUNTER
----- Message from Timur REED sent at 9/30/2024 10:52 AM EDT -----  [facility] Appointment Request    Patient needs appointment for [facility] Appointment Type: New Patient.    Patient Requested Dates(s): Any date  Patient Requested Time: Any during the day   Provider Name: Dr Ari Chavarria    Reason for Appointment Request: New Patient - Requested Provider unavailable  --------------------------------------------------------------------------------------------------------------------------    Relationship to Patient: Self     Call Back Information: OK to leave message on voicemail  Preferred Call Back Number: Phone  [phone]

## 2024-12-19 ENCOUNTER — TRANSCRIBE ORDERS (OUTPATIENT)
Dept: ADMINISTRATIVE | Age: 44
End: 2024-12-19

## 2024-12-19 DIAGNOSIS — R07.89 COSTOCHONDRAL CHEST PAIN: Primary | ICD-10-CM
